# Patient Record
Sex: FEMALE | Race: BLACK OR AFRICAN AMERICAN
[De-identification: names, ages, dates, MRNs, and addresses within clinical notes are randomized per-mention and may not be internally consistent; named-entity substitution may affect disease eponyms.]

---

## 2018-05-23 ENCOUNTER — HOSPITAL ENCOUNTER (EMERGENCY)
Dept: HOSPITAL 74 - JER | Age: 43
Discharge: LEFT BEFORE DISCHARGE | End: 2018-05-23
Payer: COMMERCIAL

## 2018-05-23 VITALS — DIASTOLIC BLOOD PRESSURE: 92 MMHG | SYSTOLIC BLOOD PRESSURE: 142 MMHG | HEART RATE: 91 BPM | TEMPERATURE: 98.2 F

## 2018-05-23 VITALS — BODY MASS INDEX: 31.4 KG/M2

## 2018-05-23 DIAGNOSIS — L30.9: ICD-10-CM

## 2018-05-23 DIAGNOSIS — Z87.442: ICD-10-CM

## 2018-05-23 DIAGNOSIS — D64.9: ICD-10-CM

## 2018-05-23 DIAGNOSIS — E07.9: Primary | ICD-10-CM

## 2018-05-23 LAB
ALBUMIN SERPL-MCNC: 3.8 G/DL (ref 3.4–5)
ALP SERPL-CCNC: 93 U/L (ref 45–117)
ALT SERPL-CCNC: 21 U/L (ref 12–78)
ANION GAP SERPL CALC-SCNC: 8 MMOL/L (ref 8–16)
AST SERPL-CCNC: 25 U/L (ref 15–37)
BASOPHILS # BLD: 1 % (ref 0–2)
BILIRUB SERPL-MCNC: 0.3 MG/DL (ref 0.2–1)
BUN SERPL-MCNC: 6 MG/DL (ref 7–18)
CALCIUM SERPL-MCNC: 9.4 MG/DL (ref 8.5–10.1)
CHLORIDE SERPL-SCNC: 104 MMOL/L (ref 98–107)
CO2 SERPL-SCNC: 25 MMOL/L (ref 21–32)
CREAT SERPL-MCNC: 0.6 MG/DL (ref 0.55–1.02)
DEPRECATED RDW RBC AUTO: 13.4 % (ref 11.6–15.6)
EOSINOPHIL # BLD: 0.5 % (ref 0–4.5)
GLUCOSE SERPL-MCNC: 77 MG/DL (ref 74–106)
HCT VFR BLD CALC: 40.1 % (ref 32.4–45.2)
HGB BLD-MCNC: 13.3 GM/DL (ref 10.7–15.3)
LYMPHOCYTES # BLD: 22.6 % (ref 8–40)
MCH RBC QN AUTO: 27.2 PG (ref 25.7–33.7)
MCHC RBC AUTO-ENTMCNC: 33.1 G/DL (ref 32–36)
MCV RBC: 82.1 FL (ref 80–96)
MONOCYTES # BLD AUTO: 9.2 % (ref 3.8–10.2)
NEUTROPHILS # BLD: 66.7 % (ref 42.8–82.8)
PLATELET # BLD AUTO: 327 K/MM3 (ref 134–434)
PMV BLD: 7.9 FL (ref 7.5–11.1)
POTASSIUM SERPLBLD-SCNC: 4.4 MMOL/L (ref 3.5–5.1)
PROT SERPL-MCNC: 7.9 G/DL (ref 6.4–8.2)
RBC # BLD AUTO: 4.89 M/MM3 (ref 3.6–5.2)
SODIUM SERPL-SCNC: 137 MMOL/L (ref 136–145)
WBC # BLD AUTO: 9.1 K/MM3 (ref 4–10)

## 2018-05-23 PROCEDURE — 3E033NZ INTRODUCTION OF ANALGESICS, HYPNOTICS, SEDATIVES INTO PERIPHERAL VEIN, PERCUTANEOUS APPROACH: ICD-10-PCS

## 2018-05-23 PROCEDURE — 3E0333Z INTRODUCTION OF ANTI-INFLAMMATORY INTO PERIPHERAL VEIN, PERCUTANEOUS APPROACH: ICD-10-PCS

## 2018-05-23 PROCEDURE — 3E0337Z INTRODUCTION OF ELECTROLYTIC AND WATER BALANCE SUBSTANCE INTO PERIPHERAL VEIN, PERCUTANEOUS APPROACH: ICD-10-PCS

## 2018-05-23 NOTE — PDOC
*Physical Exam





- Vital Signs


 Last Vital Signs











Temp Pulse Resp BP Pulse Ox


 


 98.2 F   91 H  20   142/92   100 


 


 05/23/18 10:13  05/23/18 10:13  05/23/18 10:13  05/23/18 10:13  05/23/18 10:13














- Physical Exam


Comments: 





05/25/18 12:04


General Appearance: Nourished. No Apparent Distress


HEENT: No Pharyngeal Erythema, Tonsillar Exudate, Tonsillar Erythema


Neck: No Cervical Lymphadenopathy


Respiratory/Chest: Lungs Clear, Normal Breath Sounds. No Crackles, Rales, 

Rhonchi, Wheezing


Cardiovascular: Regular Rhythm, Regular Rate. No Murmur, Gallops, Rubs


Gastrointestinal/Abdominal: Normal Bowel Sounds, Soft. No Guarding, Rebound, 

Tenderness


Musculoskeletal: No CVA Tenderness


Extremity: Normal Capillary Refill


Integumentary: Normal Color, Dry, Warm


Neurologic: Fully Oriented, Alert, Normal Mood/Affect, Normal Response, 





ED Treatment Course





- LABORATORY


CBC & Chemistry Diagram: 


 05/23/18 13:20





 05/23/18 13:20





- ADDITIONAL ORDERS


Additional order review: 


 Laboratory  Results











  05/23/18





  13:20


 


Sodium  137


 


Potassium  4.4


 


Chloride  104


 


Carbon Dioxide  25


 


Anion Gap  8


 


BUN  6 L


 


Creatinine  0.6


 


Creat Clearance w eGFR  > 60


 


Random Glucose  77


 


Calcium  9.4


 


Total Bilirubin  0.3  D


 


AST  25


 


ALT  21


 


Alkaline Phosphatase  93


 


Total Protein  7.9


 


Albumin  3.8


 


Beta HCG, Quant  < 1.0








 











  05/23/18





  13:20


 


RBC  4.89


 


MCV  82.1


 


MCHC  33.1


 


RDW  13.4


 


MPV  7.9


 


Neutrophils %  66.7


 


Lymphocytes %  22.6


 


Monocytes %  9.2


 


Eosinophils %  0.5


 


Basophils %  1.0














- RADIOLOGY


Radiology Studies Ordered: 














 Category Date Time Status


 


 ABDOMEN & PELVIS CT WITH CONTR [CT] Stat CT Scan  05/23/18 13:44 Ordered














- Medications


Given in the ED: 


ED Medications














Discontinued Medications














Generic Name Dose Route Start Last Admin





  Trade Name Freq  PRN Reason Stop Dose Admin


 


Sodium Chloride  1,000 mls @ 1,000 mls/hr  05/23/18 12:36  05/23/18 13:22





  Normal Saline -  IV  05/23/18 13:35  1,000 mls/hr





  ASDIR STA   Administration





     





     





     





     


 


Ketorolac Tromethamine  30 mg  05/23/18 12:36  05/23/18 13:22





  Toradol Injection -  IVPUSH  05/23/18 12:37  30 mg





  ONCE ONE   Administration





     





     





     





     


 


Morphine Sulfate  4 mg  05/23/18 13:32  05/23/18 13:37





  Morphine Injection -  IVPUSH  05/23/18 13:33  4 mg





  ONCE ONE   Administration





     





     





     





     


 


Morphine Sulfate  4 mg  05/23/18 14:48  05/23/18 15:03





  Morphine Injection -  IVPUSH  05/23/18 14:49  4 mg





  ONCE ONE   Administration





     





     





     





     














Progress Note





- Progress Note


Progress Note: 





Patient is a 42 year old female with a history of PID who presents for lower 

abdominal and pelvic pain.  Patient is pending an US and CT abdomen.





Medical Decision Making





- Medical Decision Making





05/23/18 16:05


CBC, cmp, serum preg are unremarkable at this time.  The patient reports 

improvement in her pain after morphine treatment.  The patient wishes to leave 

[facility] at this time.  We discussed the need for further imaging and work up and 

the patient continued to wish to leave AMA.  We discussed the risks of leaving 

[facility] including death, permanent disability, or worsening symptoms and the 

patient continued to wish to leave AMA.  IV was removed and the patient stated 

that she would present to another hospital.  The patient refused to sign 

further paperwork and left the department AMA ambulating without difficulty and 

in no acute distress.  Repeat physical exam demonstrated no abdominal pain 

prior to leaving.





*DC/Admit/Observation/Transfer


Diagnosis at time of Disposition: 


 Pelvic pain








- Discharge Dispostion


Disposition: AGAINST MEDICAL ADVICE


Condition at time of disposition: Stable


Decision to Admit order: No





- Referrals





- Patient Instructions





- Post Discharge Activity

## 2018-05-23 NOTE — PDOC
History of Present Illness





- General


History Source: Patient





- History of Present Illness


Timing/Duration: reports: getting worse


Quality: reports: other


Abdominal Pain Onset Location: reports: RLQ


Pain Radiation: reports: back





<Carson Fofana - Last Filed: 05/30/18 12:13>





<Susy Bustillos - Last Filed: 05/30/18 18:01>





- General


Chief Complaint: Pain


Stated Complaint: PAIN/ ABD, BACK


Time Seen by Provider: 05/23/18 12:15





Past History





- Past Medical History


Anemia: Yes


COPD: No


Kidney Stones: Yes


Thyroid Disease: Yes


Other medical history: eczema





- Immunization History


Immunization Up to Date: Yes





- Suicide/Smoking/Psychosocial Hx


Smoking Status: No


Smoking History: Never smoked


Number of Cigarettes Smoked Daily: 0


Information on smoking cessation initiated: No


Hx Alcohol Use: Yes (social)


Drug/Substance Use Hx: No


Substance Use Type: None





<Chantelle FofanaArelyMarley - Last Filed: 05/30/18 12:13>





<Susy Bustillos - Last Filed: 05/30/18 18:01>





- Past Medical History


Allergies/Adverse Reactions: 


 Allergies











Allergy/AdvReac Type Severity Reaction Status Date / Time


 


No Known Allergies Allergy   Verified 05/23/18 10:15











Home Medications: 


Ambulatory Orders





No Home Medications 0 dose .ROUTE UTDICT 05/05/13 


Diphenhydramine HCl [Benadryl -] 25 mg PO HS PRN 08/14/16 


Ibuprofen [Advil -] 400 mg PO TID PRN 08/14/16 











**Review of Systems





- Review of Systems


Constitutional: No: Chills, Fever


ABD/GI: Yes: Nausea, Vomiting.  No: Constipated, Diarrhea


: Yes: Flank Pain.  No: Burning, Dysuria, Discharge, Frequency, Hematuria





<Carson Fofana - Last Filed: 05/30/18 12:13>





*Physical Exam





- Vital Signs


 Last Vital Signs











Temp Pulse Resp BP Pulse Ox


 


 98.2 F   91 H  20   142/92   100 


 


 05/23/18 10:13  05/23/18 10:13  05/23/18 10:13  05/23/18 10:13  05/23/18 10:13














- Physical Exam


General Appearance: Yes: Appropriately Dressed, Moderate Distress


HEENT: positive: Normal Voice


Neck: positive: Supple


Respiratory/Chest: negative: Respiratory Distress


Female Pelvic Exam: positive: normal external exam, discharge (small amount 

milky white dc), adnexal tenderness (on R side).  negative: CMT, vaginal 

bleeding


Gastrointestinal/Abdominal: positive: Normal Bowel Sounds, Tender (to lower abd 

diffusely, no CVAT), Soft.  negative: Pulsatile Mass, Distended, Guarding, 

Rebound


Musculoskeletal: negative: CVA Tenderness


Integumentary: positive: Dry, Warm


Neurologic: positive: Fully Oriented, Alert, Normal Mood/Affect





<Carson Fofana - Last Filed: 05/30/18 12:13>





- Vital Signs


 Last Vital Signs











Temp Pulse Resp BP Pulse Ox


 


 98.2 F   91 H  20   142/92   100 


 


 05/23/18 10:13  05/23/18 10:13  05/23/18 10:13  05/23/18 10:13  05/23/18 10:13














<Susy Bustillos - Last Filed: 05/30/18 18:01>





ED Treatment Course





- LABORATORY


CBC & Chemistry Diagram: 


 05/23/18 13:20





 05/23/18 13:20





- RADIOLOGY


Radiology Studies Ordered: 














 Category Date Time Status


 


 PELVIS(OTHER) US [US] Stat Ultrasound  05/23/18 12:36 Ordered














<Carson Fofana - Last Filed: 05/30/18 12:13>





- LABORATORY


CBC & Chemistry Diagram: 


 05/23/18 13:20





 05/23/18 13:20





- ADDITIONAL ORDERS


Additional order review: 


 











  05/23/18





  13:20


 


RBC  4.89


 


MCV  82.1


 


MCHC  33.1


 


RDW  13.4


 


MPV  7.9


 


Neutrophils %  66.7


 


Lymphocytes %  22.6


 


Monocytes %  9.2


 


Eosinophils %  0.5


 


Basophils %  1.0














- Medications


Given in the ED: 


ED Medications














Discontinued Medications














Generic Name Dose Route Start Last Admin





  Trade Name Freq  PRN Reason Stop Dose Admin


 


Sodium Chloride  1,000 mls @ 1,000 mls/hr  05/23/18 12:36  05/23/18 13:22





  Normal Saline -  IV  05/23/18 13:35  1,000 mls/hr





  ASDIR STA   Administration





     





     





     





     


 


Ketorolac Tromethamine  30 mg  05/23/18 12:36  05/23/18 13:22





  Toradol Injection -  IVPUSH  05/23/18 12:37  30 mg





  ONCE ONE   Administration





     





     





     





     


 


Morphine Sulfate  4 mg  05/23/18 13:32  05/23/18 13:37





  Morphine Injection -  IVPUSH  05/23/18 13:33  4 mg





  ONCE ONE   Administration





     





     





     





     


 


Morphine Sulfate  4 mg  05/23/18 14:48  05/23/18 15:03





  Morphine Injection -  IVPUSH  05/23/18 14:49  4 mg





  ONCE ONE   Administration





     





     





     





     














<Susy Bustillos - Last Filed: 05/30/18 18:01>





Medical Decision Making





- Medical Decision Making





05/23/18 12:38





42-year-old female, history of ovarian cyst, gonorrhea, chlamydia, admitted for 

PID here in 2011, here with severe right pelvic pain.  Patient states for the 

past several days she has had intermittent pain to right lower quadrant 

radiating to back, unable to describe pain, but states it is worsening and not 

relieved with Advil.  Had 3 episodes of nausea, vomiting.  No fever, chills, 

vaginal discharge, acute change in bowel movements.  Sure if current pain feels 

like her prior PID or ovarian cyst.  States the last time she was sexually 

active was 2-3 months ago. Last menstrual 2 weeks ago. o history of kidney 

stones.   





See exam





R/o torsion vs PID vs appy vs UTI, less likely stone


-pain control


-labs


-US


-?CT


-reassess


-dispo pending





05/23/18 13:49


Signed out to Dr Ascencio pending w/u. Plan is if US/CT does not reveal source 

of pain, should have low threshold for tx for PID especially given hx. 














<Carson Fofana - Last Filed: 05/30/18 12:13>





*DC/Admit/Observation/Transfer





<Carson Fofana - Last Filed: 05/30/18 12:13>





- Attestations


Physician Attestion: 





I reviewed the case with the mid-level practitioner and agree with the mid-

level practitioner's assessment, diagnosis and disposition.








<Susy Butsillos - Last Filed: 05/30/18 18:01>


Diagnosis at time of Disposition: 


 Pelvic pain








- Discharge Dispostion


Disposition: AGAINST MEDICAL ADVICE


Condition at time of disposition: Stable

## 2019-08-31 ENCOUNTER — HOSPITAL ENCOUNTER (EMERGENCY)
Dept: HOSPITAL 74 - JER | Age: 44
Discharge: HOME | End: 2019-08-31
Payer: COMMERCIAL

## 2019-08-31 VITALS — SYSTOLIC BLOOD PRESSURE: 139 MMHG | HEART RATE: 72 BPM | DIASTOLIC BLOOD PRESSURE: 68 MMHG

## 2019-08-31 VITALS — BODY MASS INDEX: 29.8 KG/M2

## 2019-08-31 VITALS — TEMPERATURE: 97 F

## 2019-08-31 DIAGNOSIS — N83.201: ICD-10-CM

## 2019-08-31 DIAGNOSIS — N83.8: Primary | ICD-10-CM

## 2019-08-31 LAB
ALBUMIN SERPL-MCNC: 4 G/DL (ref 3.4–5)
ALP SERPL-CCNC: 83 U/L (ref 45–117)
ALT SERPL-CCNC: 16 U/L (ref 13–61)
ANION GAP SERPL CALC-SCNC: 8 MMOL/L (ref 8–16)
APPEARANCE UR: CLEAR
APTT BLD: 33.1 SECONDS (ref 25.2–36.5)
AST SERPL-CCNC: 19 U/L (ref 15–37)
BASOPHILS # BLD: 1 % (ref 0–2)
BILIRUB SERPL-MCNC: 0.3 MG/DL (ref 0.2–1)
BILIRUB UR STRIP.AUTO-MCNC: NEGATIVE MG/DL
BUN SERPL-MCNC: 11 MG/DL (ref 7–18)
CALCIUM SERPL-MCNC: 9.9 MG/DL (ref 8.5–10.1)
CHLORIDE SERPL-SCNC: 108 MMOL/L (ref 98–107)
CO2 SERPL-SCNC: 23 MMOL/L (ref 21–32)
COLOR UR: YELLOW
CREAT SERPL-MCNC: 0.7 MG/DL (ref 0.55–1.3)
DEPRECATED RDW RBC AUTO: 13.8 % (ref 11.6–15.6)
EOSINOPHIL # BLD: 0.1 % (ref 0–4.5)
GLUCOSE SERPL-MCNC: 115 MG/DL (ref 74–106)
HCT VFR BLD CALC: 39.2 % (ref 32.4–45.2)
HGB BLD-MCNC: 12.9 GM/DL (ref 10.7–15.3)
INR BLD: 0.93 (ref 0.83–1.09)
KETONES UR QL STRIP: (no result)
LEUKOCYTE ESTERASE UR QL STRIP.AUTO: NEGATIVE
LIPASE SERPL-CCNC: 79 U/L (ref 73–393)
LYMPHOCYTES # BLD: 10.9 % (ref 8–40)
MCH RBC QN AUTO: 27.4 PG (ref 25.7–33.7)
MCHC RBC AUTO-ENTMCNC: 33 G/DL (ref 32–36)
MCV RBC: 83.1 FL (ref 80–96)
MONOCYTES # BLD AUTO: 5.3 % (ref 3.8–10.2)
NEUTROPHILS # BLD: 82.7 % (ref 42.8–82.8)
NITRITE UR QL STRIP: NEGATIVE
PH UR: >= 9 [PH] (ref 5–8)
PLATELET # BLD AUTO: 326 K/MM3 (ref 134–434)
PMV BLD: 8 FL (ref 7.5–11.1)
POTASSIUM SERPLBLD-SCNC: 3.8 MMOL/L (ref 3.5–5.1)
PROT SERPL-MCNC: 7.9 G/DL (ref 6.4–8.2)
PROT UR QL STRIP: (no result)
PROT UR QL STRIP: NEGATIVE
PT PNL PPP: 11 SEC (ref 9.7–13)
RBC # BLD AUTO: 4.71 M/MM3 (ref 3.6–5.2)
SODIUM SERPL-SCNC: 140 MMOL/L (ref 136–145)
SP GR UR: 1.01 (ref 1.01–1.03)
UROBILINOGEN UR STRIP-MCNC: 0.2 MG/DL (ref 0.2–1)
WBC # BLD AUTO: 10.1 K/MM3 (ref 4–10)

## 2019-08-31 PROCEDURE — 3E033GC INTRODUCTION OF OTHER THERAPEUTIC SUBSTANCE INTO PERIPHERAL VEIN, PERCUTANEOUS APPROACH: ICD-10-PCS

## 2019-08-31 PROCEDURE — 3E033NZ INTRODUCTION OF ANALGESICS, HYPNOTICS, SEDATIVES INTO PERIPHERAL VEIN, PERCUTANEOUS APPROACH: ICD-10-PCS

## 2019-08-31 PROCEDURE — 3E0333Z INTRODUCTION OF ANTI-INFLAMMATORY INTO PERIPHERAL VEIN, PERCUTANEOUS APPROACH: ICD-10-PCS

## 2019-08-31 NOTE — EKG
Test Reason : 

Blood Pressure : ***/*** mmHG

Vent. Rate : 056 BPM     Atrial Rate : 056 BPM

   P-R Int : 072 ms          QRS Dur : 116 ms

    QT Int : 472 ms       P-R-T Axes : 000 061 081 degrees

   QTc Int : 455 ms

 

 

SINUS BRADYCARDIA WITH SHORT GA

ABNORMAL ECG

Confirmed by DYANA BERNSTEIN MD ([id number]) on 8/31/2019 11:30:25 AM

 

Referred By:             Confirmed By:DYANA BERNSTEIN MD

## 2019-08-31 NOTE — PDOC
History of Present Illness





- General


Chief Complaint: Pain


Stated Complaint: ABD PAIN


Time Seen by Provider: 19 07:51


History Source: Patient


Exam Limitations: No Limitations





- History of Present Illness


Initial Comments: 


44 year old female with PMH PID, ovarian cyst presented to ED for pelvic pain 

since this AM. Pt reported she had sexual intercourse last night, but did not 

think it could be the cause of her pain. Pt denied vaginal bleeding, vaginal 

discharge, fever, abdominal pain, dysuria. Pt reported nausea, vomiting. Pt 

denied condom or hormonal or implantation birth control. 





ROS


General: denied fever, chills, generalized weakness.


HEENT: denied sore throat, rhinorrhea, ear pain.


Cardiovascular: denied chest pain, palpitations, syncope, diaphoresis.


Respiratory: denied shortness of breath, cough, sputum production, hemoptysis.


Gastrointestinal: admitted to nausea, vomiting. denied abdominal pain, diarrhea

, constipation, blood in stool.


Genitourinary: admitted to pelvic pain, dysuria. denied hematuria, urinary 

incontinence, flank pain.


Back: denied back pain.


Musculoskeletal: denied joint pain, muscle pain, joint swelling.


Neurological: denied headache, dizziness, numbness, tingling, weakness. 


Integumentary: denied rash, laceration, abrasion.


Hematologic/Lymphatic: denied bruising or bleeding. 





PE


Constitutional: Well-nourished, Well-developed, appearing stated age.


HEENT: head is normocephalic, atraumatic. EOMI. PERRLA. no posterior pharyngeal 

erythema.no tonsillar swelling or exudates bilaterally. uvula midline. no 

peritonsillar swelling, tenderness or abscess. no jaw tenderness or 

misalignment. 


Neck: supple. Full ROM.


Cardiovascular: regular heart rhythm. no murmurs. no pericardial friction rub. 


Respiratory: clear to auscultation bilaterally. no crackles, rhonchi or 

wheezing. no stridor.


Gastrointestinal: soft, flat. tenderness to palpation of suprapubic area. 

normal bowel sounds. no rebound, guarding, masses. 


Pelvic: normal external genitalia. no CMT. no adnexal tenderness. no external 

or internal signs of trauma. 


Extremities: peripheral pulses intact. no lower extremity edema.


Neurological: CN 2-12 grossly intact. moves all four extremities. 


Psych: awake, alert, oriented x3. follows commands. answers questions 

appropriately. 








Past History





- Past Medical History


Allergies/Adverse Reactions: 


 Allergies











Allergy/AdvReac Type Severity Reaction Status Date / Time


 


No Known Allergies Allergy   Verified 19 07:58











Home Medications: 


Ambulatory Orders





Naproxen [Naprosyn -] 500 mg PO BID #10 tablet 19 


Ondansetron [Zofran Odt -] 4 mg SL TID PRN #9 od.tablet 19 








Anemia: Yes


COPD: No


Kidney Stones: Yes


Thyroid Disease: Yes





- Immunization History


Immunization Up to Date: Yes





- Suicide/Smoking/Psychosocial Hx


Smoking Status: No


Smoking History: Never smoked


Number of Cigarettes Smoked Daily: 0


Hx Alcohol Use: Yes (social)


Drug/Substance Use Hx: No


Substance Use Type: None





ED Treatment Course





- LABORATORY


CBC & Chemistry Diagram: 


 19 08:20





 19 08:20





Medical Decision Making





- Medical Decision Making


44 year old female with above PMH presented to ED for pelvic pain today 

associated with nausea/vomiting. 





 Initial Vital Signs











Temp Pulse Resp BP Pulse Ox


 


 97.0 F L  66   16   147/75   100 


 


 19 07:54  19 07:54  19 07:54  19 07:54  19 07:54








Afebrile. 


No tachycardia. 


No tachypnea. 


Mild hypertension - pt is in pain. 


No hypoxia on room air. 





Labs ordered: CBC, CMP, lipase, serum pregnancy, UA/UC, T&S


Imaging ordered: TVUS, CT abdomen/pelvis with IV contrast


Medications ordered: tylenol IV, normal saline bolus 1000 cc once, zofran 4 mg 

IV once





EKG performed at 0833: rate 56, regular rhythm, normal axis, diffuse lateral/

inferior flipped Ps, lateral/inferior ST elevation without flipped T.


EKG performed at 0856: rate 59, regular rhythm, normal axis, upright lateral/

inferior Ps, no acute ST changes.  


-1st EKG abnormalities likley to be 2/2 lead misplacement





19 09:30


Pt reported no improvement of pain with IV Tylenol. 


Medications ordered: morphine 4 mg IV once





19 09:55


 CMP











Sodium  140 mmol/L (136-145)   19  08:20    


 


Potassium  3.8 mmol/L (3.5-5.1)   19  08:20    


 


Chloride  108 mmol/L ()  H  19  08:20    


 


Carbon Dioxide  23 mmol/L (21-32)   19  08:20    


 


Anion Gap  8 MMOL/L (8-16)   19  08:20    


 


BUN  11.0 mg/dL (7-18)   19  08:20    


 


Creatinine  0.7 mg/dL (0.55-1.3)   19  08:20    


 


Est GFR (CKD-EPI)AfAm  123.86   19  08:20    


 


Est GFR (CKD-EPI)NonAf  106.87   19  08:20    


 


Random Glucose  115 mg/dL ()  H  19  08:20    


 


Calcium  9.9 mg/dL (8.5-10.1)   19  08:20    


 


Total Bilirubin  0.3 mg/dL (0.2-1)   19  08:20    


 


AST  19 U/L (15-37)   19  08:20    


 


ALT  16 U/L (13-61)   19  08:20    


 


Alkaline Phosphatase  83 U/L ()   19  08:20    


 


Troponin I  < 0.02 ng/ml (0.00-0.05)   19  08:20    


 


Total Protein  7.9 g/dl (6.4-8.2)   19  08:20    


 


Albumin  4.0 g/dl (3.4-5.0)   19  08:20    


 


Lipase  79 U/L ()   19  08:20    


 


Serum Pregnancy, Qual  Negative   19  08:21    








No electrolyte abnormalities. 


No JOSEP. 


No transaminitis. 


Lipase wnl. 


Troponin wnl. 


Negative serum pregnancy testing. 





Pt reported no improvement of pain with Morphine. 


Medications ordered: Toradol 30 mg IV once





19 11:18


 CBC











WBC  10.1 K/mm3 (4.0-10.0)  H  19  08:20    


 


RBC  4.71 M/mm3 (3.60-5.2)   19  08:20    


 


Hgb  12.9 GM/dL (10.7-15.3)   19  08:20    


 


Hct  39.2 % (32.4-45.2)   19  08:20    


 


MCV  83.1 fl (80-96)   19  08:20    


 


MCH  27.4 pg (25.7-33.7)   19  08:20    


 


MCHC  33.0 g/dl (32.0-36.0)   19  08:20    


 


RDW  13.8 % (11.6-15.6)   19  08:20    


 


Plt Count  326 K/MM3 (134-434)   19  08:20    


 


MPV  8.0 fl (7.5-11.1)   19  08:20    


 


Absolute Neuts (auto)  8.3 K/mm3 (1.5-8.0)  H  19  08:20    


 


Neutrophils %  82.7 % (42.8-82.8)  D 19  08:20    


 


Lymphocytes %  10.9 % (8-40)  D 19  08:20    


 


Monocytes %  5.3 % (3.8-10.2)   19  08:20    


 


Eosinophils %  0.1 % (0-4.5)   19  08:20    


 


Basophils %  1.0 % (0-2.0)   19  08:20    


 


Nucleated RBC %  0 % (0-0)   19  08:20    








Mild leukocytosis with mild left shift. 


No anemia. 





 INR, PTT











INR  0.93  (0.83-1.09)   19  08:20    











19 11:19


CT report: Name: [facility] DEPARTMENT OF RADIOLOGY Phys: Patience Clemons 

RESIDENT : 1975 Age: 44 Sex: [facility] Acct: 

[id number] Loc: [address] Exam Date: 19 Status: [place] [address] Unit Number: [id number] [phone] ACCESSION #: [id number] 

EXAM#: TYPE/EXAM: RESULT: [id number] CT/ABDOMEN PELVIS CT WITH CONTR Abdomen and 

pelvis CT with contrast Clinical information given: RLQ/pelvic pain, nausea/

emesis; (-) CMT/adnexal tenderness; history of left ovarian cyst Multiplanar 

imaging was performed following intravenous administration of nonionic 

contrast. In comparison to a CT exam of 2013 interval development of an 

approximately 7.4 x 5.3 cm multiloculated right adnexal fluid structure is 

noted. A very small amount of free fluid is seen within the right posterior 

adnexa and cul-de-sac. Interval resolution of a 5 cm left ovarian cyst is 

noted. No evidence of pneumoperitoneum or bowel obstruction. There is no gross 

noncontrast small bowel pathology. The appendix is not definitely visualized 

however there are no obvious indirect CT signs of acute appendicitis. The liver

, spleen, pancreas, gallbladder, adrenal glands and kidneys demonstrate no 

discrete abnormality. There is no aortic aneurysm. No obvious lymphadenopathy 

is noted. The visualized osseous structures demonstrate no obvious CT evidence 

of acute abnormality. Impression: A nonspecific approximately 7.4 x 5.3 cm 

multiloculated right adnexal fluid structure is noted possibly representing a 

tubo-ovarian abscess versus neoplastic disease. Correlate clinically and with 

sonography. A very small amount of free fluid is seen within the right 

posterior adnexa and cul-de-sac. Reported By: Wade Stout MD [id number] 





OBGYN paged. 





19 11:48


OBGYNDr. Sinclair at bedside for evaluation. 





TVUS report: Name: [facility] DEPARTMENT OF RADIOLOGY Phys: Patience Clemons 

RESIDENT : 1975 Age: 44 Sex: F [facility] Acct: 

[id number] Loc: [address] Exam Date: 19 Status: [place] [address] Unit Number: [id number] [phone] ACCESSION #: [id number] 

EXAM#: TYPE/EXAM: RESULT: [id number] US/TRANSVAGINAL ULTRASOUND US Transvaginal 

ultrasound Clinical information: pelvic pain, history of left ovarian cyst, 

evaluate for torsion The exam was performed utilizing transvaginal scanning. 

Visualization is somewhat limited as the patient was unable to fully tolerate 

standard ultrasound probe pressure. As noted on recent performed CT a 

nonspecific complex approximately 7.4 x 5.3 cm multiloculated/ multicystic 

right adnexal fluid structure is seen. On the basis of this exam it is 

uncertain whether this structure is ovarian in origin versus within the 

contiguous adnexa. There is mild increased nonspecific vascularity along the 

periphery of this complex structure. Normal-appearing right ovarian tissue is 

difficult to visualize and therefore evaluation for possible torsion cannot be 

performed. The left ovary could not be definitely visualized at this time. A 

trace amount of free fluid is seen within the right adnexa and cul-de-sac. No 

obvious uterine pathology is seen. Slight endometrial thickening is noted 

measuring 0.8 cm. Impression: A nonspecific complex 7.5 x 5.3 cm right adnexal 

fluid structure is seen - ? possible tubo-ovarian abscess versus neoplastic 

disease. Reported By: Wade Stout MD [id number] 





19 11:51


Dr. Sinclair recommends outpatient F/U with pt's OBGYN, GC/Chlamydia cervical 

swab testing, Naproxen prescription. 


Pt refused STI antibiotic prophylaxis. 


Call back placed for patient to be called with GC/Chlamydia results. 


Pt informed of recommendation, with which she is comfortable with. 





Pending UA. 





19 12:18


Pt reported she cannot wait for UA testing. 


Pt requesting discharge. 


Will follow UA. 





19 16:39


 Urine Test Results











Urine Color  Yellow   19  08:20    


 


Urine Appearance  Clear   19  08:20    


 


Urine pH  >= 9.0  (5.0-8.0)  H D 19  08:20    


 


Ur Specific Gravity  1.013  (1.010-1.035)   19  08:20    


 


Urine Protein  Trace  (NEGATIVE)   19  08:20    


 


Urine Glucose (UA)  Negative  (NEGATIVE)   19  08:20    


 


Urine Ketones  1+  (NEGATIVE)  H  19  08:20    


 


Urine Blood  Negative  (NEGATIVE)   19  08:20    


 


Urine Nitrite  Negative  (NEGATIVE)   19  08:20    


 


Urine Bilirubin  Negative  (NEGATIVE)   19  08:20    


 


Ur Leukocyte Esterase  Negative  (NEGATIVE)   19  08:20    








Negative for UTI. 


Negative for hematuria. 


Negative for proteinuria. 








*DC/Admit/Observation/Transfer


Diagnosis at time of Disposition: 


 Ovarian mass








- Discharge Dispostion


Disposition: HOME


Condition at time of disposition: Improved


Decision to Admit order: No





- Prescriptions


Prescriptions: 


Naproxen [Naprosyn -] 500 mg PO BID #10 tablet


Ondansetron [Zofran Odt -] 4 mg SL TID PRN #9 od.tablet


 PRN Reason: Nausea





- Referrals


Referrals: 


Ene Shelton MD [Staff Physician] - 





- Patient Instructions


Printed Discharge Instructions:  DI for Ovarian Cyst


Additional Instructions: 


You have a large ovarian mass. 





I have sent a prescription for Naproxen to your pharmacy to take for pain. Take 

as advised on label. Take with food. 


I have sent a prescription for Zofran to your pharmacy to take for nausea/

vomiting. Take as advised on label as needed. 





You will be called with the results of your STD testing in a few days. 





You must follow up with your OBGYN within 4 days. Call today and ask for your 

appointment to be moved to the soonest available, let them know you were seen 

in the Emergency Department. Bring all paperwork you were given today. Bring 

all medication bottles you are taking. 





Return to the Emergency Department for increasing pain despite Naproxen use, 

fever, vomiting, vaginal bleeding, increasing vaginal discharge, chest pain, 

shortness of breath, or any other new, worsening or concerning symptoms. 





- Post Discharge Activity


Forms/Work/School Notes:  Back to Work

## 2019-08-31 NOTE — CON.OBG
Consult


Consult Specialty:: ob/gyn


Referred by:: Deonte Morin( ER Resident), Cher Young (attending )


Reason for Consultation:: rt adnexal cyst





- History of Present Illness


Chief Complaint: 44 yrs  Lmp 2 wks ago , c/o sudden onset of, sharp  

lower abd pain after sexual contact.  work up in the ED Ct scan : Rt side 

mutiloculated fluid structre 7.4x5.3cm possible toa or neoplastic.  Pelvic us : 

complex 7.5x5.3cm possible neoplastic or abscess


History of Present Illness: 


pt has h/o vomiting few times with sudden onset , sharp pain this AM in lower 

abd .


pt states her gynecologist Dr Davis has been following her for Rt Adnexal 

cyst 


she was hospitalized for the same  about 3-4 months ago at [facility] for 2-3 days , she was discharged , stating she did not have 

torsion.she did not require surgery .


 h/o similar pain in  , on left side similar type complex mass was seen on 

ultrasound. 





Gyn history


Last pap 1 yr ago 


MH 28-30 days x 3-4 days , reg , mod , painless 


 Ob Hx 3  , , &  


          3 Ind Ab 


 Contraception None 


 


STD chlamydia many tra ago 


denies h/o pid or toa 














- History Source


History Provided By: Patient


Limitations to Obtaining History: No Limitations





- Past Medical History


CNS: Yes: Other (none)


Pulmonary: Yes: Other (none)


Gastrointestinal: Yes: Other (vomiting today)


Renal/: Yes: Other (no urine symptoms )


Reproductive: Yes: Other (none )


...LMP: 08/15/19 (2 weeks ago )


...Pregnant: No


...: 6 ()


...Para: 3 (3  , , ,  )


Infectious Disease: Yes: STD's (h/o CT many yrs ago)


Psych: No: Addictions, Anxiety, Bipolar, Depression, Panic, Psychosis, 

Schizophrenia


Endocrine: Yes: Other (none)


Dermatology: Yes: Other (h/o allergic reaction secondary to hep -b vaccine )





- Past Surgical History


Past Surgical History: Yes: None





- Alcohol/Substance Use


Hx Alcohol Use: Yes (social)


History of Substance Use: reports: Marijuana (3 times aweek, 2-3 times a day)





- Smoking History


Smoking history: Never smoked


Have you smoked in the past 12 months: No


Aproximately how many cigarettes per day: 0





Home Medications





- Allergies


Allergies/Adverse Reactions: 


 Allergies











Allergy/AdvReac Type Severity Reaction Status Date / Time


 


No Known Allergies Allergy   Verified 19 07:58














- Home Medications


Home Medications: 


Ambulatory Orders





Naproxen [Naprosyn -] 500 mg PO BID #10 tablet 19 


Ondansetron [Zofran Odt -] 4 mg SL TID PRN #9 od.tablet 19 











Physical Exam-GYN


Vital Signs: 


 Vital Signs











Temperature  97.0 F L  19 07:54


 


Pulse Rate  66   19 07:54


 


Respiratory Rate  16   19 07:54


 


Blood Pressure  147/75   19 07:54


 


O2 Sat by Pulse Oximetry (%)  100   19 07:54











Constitutional: Yes: Well Nourished, Mild Distress


Gastrointestinal: Yes: WNL, Normal Bowel Sounds, Soft, Abdomen, Obese, 

Tenderness (lower abd).  No: Tenderness, Rebound


...Rectal Exam: Yes: Deferred


Renal/: Yes: Vaginal Discharge (normal).  No: CVA Tenderness - Left, CVA 

Tenderness - Right, Vaginal Bleeding


Pelvis: Yes: Tenderness (lower abd , mainly suprapubic)


External Genitalia: Yes: Normal


Vaginal Exam: Yes: Normal


Cervix: Yes: Normal.  No: Cerv Motion Tenderness


Uterus: Yes: Normal, Anteverted


Adnexa: Normal: Left, Tender: Right, Enlarged: Right


Musculoskeletal: Yes: WNL


Extremities: Yes: WNL.  No: Delayed Capillary Refill


Edema: No


Integumentary: Yes: Other (marks of dermatitis on lower extremities)


...Motor Strength: WNL


Psychiatric: Yes: WNL


Labs: 


 CBC, BMP





 19 08:20 





 19 08:20 











Problem List





- Problems


(1) Right ovarian cyst


Code(s): N83.201 - UNSPECIFIED OVARIAN CYST, RIGHT SIDE   





(2) Pelvic pain


Code(s): R10.2 - PELVIC AND PERINEAL PAIN   





Assessment/Plan


44 yrs , lmp 2 weeks ago with ac onset of pain , with  rt adnexyal cyst 

known to her for 3-4 months presents with sudden onset of pain, possible due to 

ovulation pain ,  Mittelschmerz hemorrhage , hence complex appearance on 

sonogram 





pt is stable, 


she will follow with her Gyn MD 


she declied antibiotics 


gc/ct culture taken 


naprosyn prn for pain

## 2019-08-31 NOTE — PDOC
Documentation entered by Perla Polk SCRIBE, acting as scribe for Hannah Kwon DO.








Hannah Kwon, DO:  This documentation has been prepared by the Felicitas vásquez Adrianna, SCRIBE, under my direction and personally reviewed by me in its 

entirety.  I confirm that the documentation accurately reflects all work, 

treatment, procedures, and medical decision making performed by me.  





Attending Attestation





- Resident


Resident Name: Patience Clemons





- ED Attending Attestation


I have performed the following: I have examined & evaluated the patient, The 

case was reviewed & discussed with the resident, I agree w/resident's findings 

& plan, Exceptions are as noted





- HPI


HPI: 


The patient is a 44 year old female, with a significant PMH of PID, ovarian cyst

, kidney stones, and anemia, who presents to the ED for evaluation of pelvic 

pain for one day. Patient reports sharp, intermittent pelvic pain that began 

earlier this morning. She reports multiple episodes of nausea and NBNB vomit, 

and endorses urinary hesitancy. Patient notes she had sexual intercourse last 

night, but does not believe that this was causal of her pain. She denies any 

dysuria, vaginal discharge, vaginal bleeding, or use of contraceptives. Patient 

notes one episode of similar symptoms 3 months ago, where GYN was concerned for 

a right ovarian cyst torsion, but decided to observe at that time. LMP was 2 

weeks ago. 





Allergies: NKA, NKDA


Surgical HIstory: None reported


Social History: Denies EtOH, tobacco, or illicit drug use 





- Physicial Exam


PE: 


Constitutional: Awake, alert, oriented.  No acute distress.


Head:  Normocephalic.  Atraumatic


Eyes:  PERRL. EOMI.  Conjunctivae are not pale.


Cardiovascular:  Regular rate.  Regular rhythm. S1, S2 regular.  Distal pulses 

are 2+ and symmetric.  


Pulmonary/Chest:  No evidence of respiratory distress.  Clear to auscultation 

bilaterally  No wheezing, rales or rhonchi.


Abdominal:  +Suprapubic tenderness to palpation. Soft and nondistended. No 

rebound, guarding or rigidity.  No organomegaly. No palpable masses. Good bowel 

sounds.


Back:  No CVA tenderness.


Musculoskeletal:  No edema.  No cyanosis.  No clubbing.  Full range of motion 

in all extremities.  Nocalf tenderness. Radial/pedal pulses are intact and 2+ 

bilaterally


Skin:  Skin is warm and dry.  No petechiae.  No purpura.  


Neurological:  Alert and oriented.  Cranial nerves II-XII are grossly intact. 

No focal neuro deficits. 


Psychiatric:  Good eye contact.  Normal interaction, affect and behavior.








- Medical Decision Making





08/31/19 10:15








I, Dr. Hannah Kwon, DO, attest that this document has been prepared under 

my direction and personally reviewed by me in its entirety.   I further attest, 

that it accurately reflects all work, treatment, procedures and medical decision

-making performed by me.  


08/31/19 10:15


a/p: 45yo female with acute onset of lower abd pain around 330a


-pt admits to having intercourse prior to pain starting


-hx of torsion and ovarian cysts


-pt denies vaginal discharge or bleeding


-pt denies diarrhea, but has had n/v in the ED


-pt appears uncomfortable and in pain


-pt with ttp over the bladder and uterus, but denies dysuria or hematuria


-will send labs, tvus


-will medicate for pain


-also with mild RLQ pain, n/v concern for poss early appy, will also send for 

ct abd/pelvis with contrast


-will monitor and reassess


08/31/19 10:17


-serum preg neg


-chem reviewed


-cbc and ua pending


08/31/19 11:23


pt with wbc 10


loculated cyst vs toa to R adnexa - concerning for neoplasm or toa


call placed to GYN


08/31/19 11:56


dr. fonseca at the bedside to eval the patient


08/31/19 12:17


pt ok to dc home with naproxyn and zofran per GYN and follow up with izzy 

on 9/5


pt feeling much better after toradol


pt ambulatory with a steady gait 


dc to home





**Heart Score/ECG Review





- ECG Intrepretation


Comment:: 





08/31/19 10:17


sinus arturo at 56, mild st elevation with p wave inversions inferior leads - 

abnl ekg however, suspect abnl lead placement


08/31/19 10:18


repeat ekg: sinus arturo at 59, nl axis, nl interval, no acute st/t wave findings

## 2019-08-31 NOTE — EKG
Test Reason : 

Blood Pressure : ***/*** mmHG

Vent. Rate : 059 BPM     Atrial Rate : 059 BPM

   P-R Int : 144 ms          QRS Dur : 084 ms

    QT Int : 454 ms       P-R-T Axes : 048 058 051 degrees

   QTc Int : 449 ms

 

SINUS BRADYCARDIA WITH SINUS ARRHYTHMIA

OTHERWISE NORMAL ECG

WHEN COMPARED WITH ECG OF 31-AUG-2019 08:33,

QRS DURATION HAS DECREASED

ST NO LONGER ELEVATED IN INFERIOR LEADS

ST NO LONGER ELEVATED IN ANTEROLATERAL LEADS

Confirmed by DYANA BERNSTEIN MD ([id number]) on 8/31/2019 11:29:14 AM

 

Referred By:             Confirmed By:DYANA BERNSTEIN MD

## 2022-04-27 ENCOUNTER — HOSPITAL ENCOUNTER (EMERGENCY)
Dept: HOSPITAL 74 - JERFT | Age: 47
Discharge: HOME | End: 2022-04-27
Payer: COMMERCIAL

## 2022-04-27 VITALS — HEART RATE: 71 BPM | TEMPERATURE: 97.6 F | DIASTOLIC BLOOD PRESSURE: 97 MMHG | SYSTOLIC BLOOD PRESSURE: 149 MMHG

## 2022-04-27 VITALS — BODY MASS INDEX: 29 KG/M2

## 2022-04-27 DIAGNOSIS — D25.9: ICD-10-CM

## 2022-04-27 DIAGNOSIS — N93.9: Primary | ICD-10-CM

## 2022-04-27 LAB
ALBUMIN SERPL-MCNC: 3.8 G/DL (ref 3.4–5)
ALP SERPL-CCNC: 87 U/L (ref 45–117)
ALT SERPL-CCNC: 17 U/L (ref 13–61)
ANION GAP SERPL CALC-SCNC: 5 MMOL/L (ref 8–16)
APPEARANCE UR: (no result)
APTT BLD: 34.1 SECONDS (ref 25.2–36.5)
AST SERPL-CCNC: 19 U/L (ref 15–37)
BACTERIA # UR AUTO: (no result) /UL (ref 0–1359)
BASOPHILS # BLD: 1.1 % (ref 0–2)
BILIRUB SERPL-MCNC: 0.3 MG/DL (ref 0.2–1)
BILIRUB UR STRIP.AUTO-MCNC: NEGATIVE MG/DL
BUN SERPL-MCNC: 5.3 MG/DL (ref 7–18)
CALCIUM SERPL-MCNC: 9.2 MG/DL (ref 8.5–10.1)
CASTS URNS QL MICRO: 4 /UL (ref 0–3.1)
CHLORIDE SERPL-SCNC: 104 MMOL/L (ref 98–107)
CO2 SERPL-SCNC: 28 MMOL/L (ref 21–32)
COLOR UR: YELLOW
CREAT SERPL-MCNC: 0.6 MG/DL (ref 0.55–1.3)
DEPRECATED RDW RBC AUTO: 14.3 % (ref 11.6–15.6)
EOSINOPHIL # BLD: 2 % (ref 0–4.5)
EPITH CASTS URNS QL MICRO: >36 /UL (ref 0–25.1)
GLUCOSE SERPL-MCNC: 73 MG/DL (ref 74–106)
HCT VFR BLD CALC: 39.6 % (ref 32.4–45.2)
HGB BLD-MCNC: 13.2 GM/DL (ref 10.7–15.3)
INR BLD: 1.02 (ref 0.83–1.09)
KETONES UR QL STRIP: (no result)
LEUKOCYTE ESTERASE UR QL STRIP.AUTO: (no result)
LYMPHOCYTES # BLD: 36.5 % (ref 8–40)
MCH RBC QN AUTO: 28.1 PG (ref 25.7–33.7)
MCHC RBC AUTO-ENTMCNC: 33.4 G/DL (ref 32–36)
MCV RBC: 84.1 FL (ref 80–96)
MONOCYTES # BLD AUTO: 9.2 % (ref 3.8–10.2)
NEUTROPHILS # BLD: 51.2 % (ref 42.8–82.8)
NITRITE UR QL STRIP: NEGATIVE
PH UR: 5.5 [PH] (ref 5–8)
PLATELET # BLD AUTO: 313 10^3/UL (ref 134–434)
PMV BLD: 7.7 FL (ref 7.5–11.1)
PROT SERPL-MCNC: 7.4 G/DL (ref 6.4–8.2)
PROT UR QL STRIP: (no result)
PROT UR QL STRIP: NEGATIVE
PT PNL PPP: 11.7 SEC (ref 9.7–13)
RBC # BLD AUTO: 2962 /UL (ref 0–23.9)
RBC # BLD AUTO: 4.71 M/MM3 (ref 3.6–5.2)
SODIUM SERPL-SCNC: 136 MMOL/L (ref 136–145)
SP GR UR: 1.01 (ref 1.01–1.03)
UROBILINOGEN UR STRIP-MCNC: 0.2 MG/DL (ref 0.2–1)
WBC # BLD AUTO: 5.7 K/MM3 (ref 4–10)
WBC # UR AUTO: 25 /UL (ref 0–25.8)

## 2022-04-27 PROCEDURE — 3E033GC INTRODUCTION OF OTHER THERAPEUTIC SUBSTANCE INTO PERIPHERAL VEIN, PERCUTANEOUS APPROACH: ICD-10-PCS

## 2022-04-27 PROCEDURE — 3E0337Z INTRODUCTION OF ELECTROLYTIC AND WATER BALANCE SUBSTANCE INTO PERIPHERAL VEIN, PERCUTANEOUS APPROACH: ICD-10-PCS

## 2022-04-27 PROCEDURE — 3E033NZ INTRODUCTION OF ANALGESICS, HYPNOTICS, SEDATIVES INTO PERIPHERAL VEIN, PERCUTANEOUS APPROACH: ICD-10-PCS

## 2022-04-28 ENCOUNTER — HOSPITAL ENCOUNTER (EMERGENCY)
Dept: HOSPITAL 74 - JERFT | Age: 47
Discharge: HOME | End: 2022-04-28
Payer: COMMERCIAL

## 2022-04-28 VITALS — SYSTOLIC BLOOD PRESSURE: 137 MMHG | HEART RATE: 79 BPM | DIASTOLIC BLOOD PRESSURE: 94 MMHG | TEMPERATURE: 98.1 F

## 2022-04-28 VITALS — BODY MASS INDEX: 29 KG/M2

## 2022-04-28 DIAGNOSIS — D25.9: ICD-10-CM

## 2022-04-28 DIAGNOSIS — N93.9: Primary | ICD-10-CM

## 2022-04-28 PROCEDURE — 3E0233Z INTRODUCTION OF ANTI-INFLAMMATORY INTO MUSCLE, PERCUTANEOUS APPROACH: ICD-10-PCS
